# Patient Record
Sex: MALE | Race: WHITE
[De-identification: names, ages, dates, MRNs, and addresses within clinical notes are randomized per-mention and may not be internally consistent; named-entity substitution may affect disease eponyms.]

---

## 2018-10-13 ENCOUNTER — HOSPITAL ENCOUNTER (EMERGENCY)
Dept: HOSPITAL 94 - ER | Age: 48
Discharge: HOME | End: 2018-10-13
Payer: MEDICAID

## 2018-10-13 VITALS — WEIGHT: 145.31 LBS | HEIGHT: 66 IN | BODY MASS INDEX: 23.35 KG/M2

## 2018-10-13 VITALS — SYSTOLIC BLOOD PRESSURE: 166 MMHG | DIASTOLIC BLOOD PRESSURE: 88 MMHG

## 2018-10-13 DIAGNOSIS — Z96.22: ICD-10-CM
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
## 2018-10-13 DIAGNOSIS — S61.217A: Primary | ICD-10-CM

## 2018-10-13 DIAGNOSIS — Z88.0: ICD-10-CM
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
## 2018-10-13 DIAGNOSIS — F12.90: ICD-10-CM
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
## 2018-10-13 DIAGNOSIS — F15.90: ICD-10-CM

## 2018-10-13 DIAGNOSIS — Z87.891: ICD-10-CM
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
## 2018-10-13 PROCEDURE — 99284 EMERGENCY DEPT VISIT MOD MDM: CPT

## 2018-10-13 PROCEDURE — 90715 TDAP VACCINE 7 YRS/> IM: CPT

## 2018-10-13 PROCEDURE — 73140 X-RAY EXAM OF FINGER(S): CPT

## 2018-10-13 PROCEDURE — 90471 IMMUNIZATION ADMIN: CPT

## 2018-10-13 PROCEDURE — 96365 THER/PROPH/DIAG IV INF INIT: CPT

## 2019-01-13 ENCOUNTER — HOSPITAL ENCOUNTER (EMERGENCY)
Dept: HOSPITAL 94 - ER | Age: 49
Discharge: HOME | End: 2019-01-13
Payer: MEDICAID

## 2019-01-13 VITALS — WEIGHT: 140.21 LBS | HEIGHT: 66 IN | BODY MASS INDEX: 22.53 KG/M2

## 2019-01-13 VITALS — SYSTOLIC BLOOD PRESSURE: 128 MMHG | DIASTOLIC BLOOD PRESSURE: 82 MMHG

## 2019-01-13 DIAGNOSIS — F12.90: ICD-10-CM

## 2019-01-13 DIAGNOSIS — A60.01: Primary | ICD-10-CM

## 2019-01-13 DIAGNOSIS — F15.90: ICD-10-CM

## 2019-01-13 PROCEDURE — 99283 EMERGENCY DEPT VISIT LOW MDM: CPT

## 2019-01-13 PROCEDURE — 86695 HERPES SIMPLEX TYPE 1 TEST: CPT

## 2019-01-13 PROCEDURE — 86696 HERPES SIMPLEX TYPE 2 TEST: CPT

## 2019-01-13 PROCEDURE — 36415 COLL VENOUS BLD VENIPUNCTURE: CPT

## 2019-01-13 NOTE — NUR
Called patient and davidson answered. Davidson is listed on patients contact 
information, but stated that I had the wrong [email or web address]

## 2019-01-28 ENCOUNTER — HOSPITAL ENCOUNTER (EMERGENCY)
Dept: HOSPITAL 94 - ER | Age: 49
Discharge: HOME | End: 2019-01-28
Payer: MEDICAID

## 2019-01-28 VITALS — BODY MASS INDEX: 24.59 KG/M2 | HEIGHT: 66 IN | WEIGHT: 153 LBS

## 2019-01-28 VITALS — DIASTOLIC BLOOD PRESSURE: 67 MMHG | SYSTOLIC BLOOD PRESSURE: 116 MMHG

## 2019-01-28 DIAGNOSIS — F12.90: ICD-10-CM

## 2019-01-28 DIAGNOSIS — Z59.0: ICD-10-CM

## 2019-01-28 DIAGNOSIS — F15.90: ICD-10-CM

## 2019-01-28 DIAGNOSIS — Z88.0: ICD-10-CM

## 2019-01-28 DIAGNOSIS — N48.1: Primary | ICD-10-CM

## 2019-01-28 LAB
BACTERIA URNS QL MICRO: (no result) /HPF
CAOX CRY URNS QL MICRO: (no result) /HPF
CLARITY UR: (no result)
COLOR UR: YELLOW
DEPRECATED SQUAMOUS URNS QL MICRO: (no result) /LPF
GLUCOSE UR STRIP-MCNC: NEGATIVE MG/DL
HGB UR QL STRIP: (no result)
KETONES UR STRIP-MCNC: (no result) MG/DL
LEUKOCYTE ESTERASE UR QL STRIP: (no result)
MIXED CELL CASTS #/AREA URNS HPF: (no result) /LPF
MUCOUS THREADS URNS QL MICRO: (no result) /LPF
NITRITE UR QL STRIP: NEGATIVE
PH UR STRIP: 5.5 [PH] (ref 4.8–8)
PROT UR QL STRIP: NEGATIVE MG/DL
RBC #/AREA URNS HPF: (no result) /HPF (ref 0–2)
SP GR UR STRIP: >=1.03 (ref 1–1.03)
URN COLLECT METHOD CLASS: (no result)
UROBILINOGEN UR STRIP-MCNC: 0.2 E.U/DL (ref 0.2–1)

## 2019-01-28 PROCEDURE — 87088 URINE BACTERIA CULTURE: CPT

## 2019-01-28 PROCEDURE — 36415 COLL VENOUS BLD VENIPUNCTURE: CPT

## 2019-01-28 PROCEDURE — 99283 EMERGENCY DEPT VISIT LOW MDM: CPT

## 2019-01-28 PROCEDURE — 86592 SYPHILIS TEST NON-TREP QUAL: CPT

## 2019-01-28 PROCEDURE — 87591 N.GONORRHOEAE DNA AMP PROB: CPT

## 2019-01-28 PROCEDURE — 87491 CHLMYD TRACH DNA AMP PROBE: CPT

## 2019-01-28 PROCEDURE — 87077 CULTURE AEROBIC IDENTIFY: CPT

## 2019-01-28 PROCEDURE — 81001 URINALYSIS AUTO W/SCOPE: CPT

## 2019-01-28 PROCEDURE — 87186 SC STD MICRODIL/AGAR DIL: CPT

## 2019-01-28 SDOH — ECONOMIC STABILITY - HOUSING INSECURITY: HOMELESSNESS: Z59.0

## 2020-03-24 ENCOUNTER — HOSPITAL ENCOUNTER (EMERGENCY)
Dept: HOSPITAL 94 - ER | Age: 50
Discharge: HOME | End: 2020-03-24
Payer: MEDICAID

## 2020-03-24 VITALS — WEIGHT: 145.51 LBS | HEIGHT: 66 IN | BODY MASS INDEX: 23.38 KG/M2

## 2020-03-24 VITALS — SYSTOLIC BLOOD PRESSURE: 133 MMHG | DIASTOLIC BLOOD PRESSURE: 82 MMHG

## 2020-03-24 DIAGNOSIS — Z86.19: ICD-10-CM

## 2020-03-24 DIAGNOSIS — B00.9: Primary | ICD-10-CM

## 2020-03-24 DIAGNOSIS — Z59.0: ICD-10-CM

## 2020-03-24 DIAGNOSIS — F12.90: ICD-10-CM

## 2020-03-24 DIAGNOSIS — Z79.899: ICD-10-CM

## 2020-03-24 DIAGNOSIS — Z98.890: ICD-10-CM

## 2020-03-24 DIAGNOSIS — Z88.0: ICD-10-CM

## 2020-03-24 DIAGNOSIS — N48.5: ICD-10-CM

## 2020-03-24 DIAGNOSIS — F15.90: ICD-10-CM

## 2020-03-24 PROCEDURE — 86592 SYPHILIS TEST NON-TREP QUAL: CPT

## 2020-03-24 PROCEDURE — 96372 THER/PROPH/DIAG INJ SC/IM: CPT

## 2020-03-24 PROCEDURE — 36415 COLL VENOUS BLD VENIPUNCTURE: CPT

## 2020-03-24 PROCEDURE — 87591 N.GONORRHOEAE DNA AMP PROB: CPT

## 2020-03-24 PROCEDURE — 87491 CHLMYD TRACH DNA AMP PROBE: CPT

## 2020-03-24 PROCEDURE — 99284 EMERGENCY DEPT VISIT MOD MDM: CPT

## 2020-03-24 SDOH — ECONOMIC STABILITY - HOUSING INSECURITY: HOMELESSNESS: Z59.0

## 2025-02-25 ENCOUNTER — HOSPITAL ENCOUNTER (INPATIENT)
Dept: HOSPITAL 94 - ER | Age: 55
LOS: 2 days | Discharge: HOME | DRG: 720 | End: 2025-02-27
Attending: HOSPITALIST | Admitting: HOSPITALIST
Payer: COMMERCIAL

## 2025-02-25 VITALS
TEMPERATURE: 97.7 F | SYSTOLIC BLOOD PRESSURE: 137 MMHG | OXYGEN SATURATION: 90 % | DIASTOLIC BLOOD PRESSURE: 71 MMHG | HEART RATE: 101 BPM | RESPIRATION RATE: 36 BRPM

## 2025-02-25 VITALS — BODY MASS INDEX: 22.68 KG/M2 | HEIGHT: 66 IN | WEIGHT: 141.1 LBS

## 2025-02-25 DIAGNOSIS — D64.9: ICD-10-CM

## 2025-02-25 DIAGNOSIS — Z20.822: ICD-10-CM

## 2025-02-25 DIAGNOSIS — J18.9: ICD-10-CM

## 2025-02-25 DIAGNOSIS — Z88.0: ICD-10-CM

## 2025-02-25 DIAGNOSIS — R31.9: ICD-10-CM

## 2025-02-25 DIAGNOSIS — N17.0: ICD-10-CM

## 2025-02-25 DIAGNOSIS — N40.0: ICD-10-CM

## 2025-02-25 DIAGNOSIS — E87.1: ICD-10-CM

## 2025-02-25 DIAGNOSIS — Z88.1: ICD-10-CM

## 2025-02-25 DIAGNOSIS — Z79.899: ICD-10-CM

## 2025-02-25 DIAGNOSIS — A40.8: Primary | ICD-10-CM

## 2025-02-25 LAB
ALBUMIN SERPL BCP-MCNC: 2.4 G/DL (ref 3.4–5)
ALBUMIN/GLOB SERPL: 0.4 {RATIO} (ref 1.1–1.5)
ALP SERPL-CCNC: 88 IU/L (ref 46–116)
ALT SERPL W P-5'-P-CCNC: 63 U/L (ref 12–78)
AMPHETAMINES UR QL SCN: POSITIVE
ANION GAP SERPL CALCULATED.3IONS-SCNC: 7 MMOL/L (ref 8–16)
AST SERPL W P-5'-P-CCNC: 50 U/L (ref 10–37)
BACTERIA URNS QL MICRO: (no result) /HPF
BARBITURATES UR QL SCN: NEGATIVE
BASOPHILS # BLD AUTO: 0 X10'3 (ref 0–0.2)
BASOPHILS NFR BLD AUTO: 0.2 % (ref 0–1)
BENZODIAZ UR QL SCN: NEGATIVE
BILIRUB SERPL-MCNC: 0.8 MG/DL (ref 0.1–1)
BILIRUB UR QL STRIP: (no result)
BUN SERPL-MCNC: 19 MG/DL (ref 7–18)
BUN/CREAT SERPL: 16.2 (ref 10–20)
BZE UR QL SCN: NEGATIVE
CALCIUM SERPL-MCNC: 8.6 MG/DL (ref 8.5–10.1)
CANNABINOIDS UR QL SCN: POSITIVE
CHLORIDE SERPL-SCNC: 91 MMOL/L (ref 99–107)
CLARITY UR: CLEAR
CO2 SERPL-SCNC: 29.4 MMOL/L (ref 24–32)
COARSE GRAN CASTS URNS QL MICRO: (no result) /LPF
COLOR UR: (no result)
CREAT CL PREDICTED SERPL C-G-VRATE: 64 ML/MIN
CREAT SERPL-MCNC: 1.17 MG/DL (ref 0.6–1.1)
DEPRECATED SQUAMOUS URNS QL MICRO: (no result) /LPF
EOSINOPHIL # BLD AUTO: 0 X10'3 (ref 0–0.9)
EOSINOPHIL NFR BLD AUTO: 0 % (ref 0–6)
ERYTHROCYTE [DISTWIDTH] IN BLOOD BY AUTOMATED COUNT: 14.2 % (ref 11.5–14.5)
GFR SERPL CREATININE-BSD FRML MDRD: 64 ML/MIN
GLOBULIN SER CALC-MCNC: 5.5 G/DL (ref 2.7–4.3)
GLUCOSE SERPL-MCNC: 110 MG/DL (ref 70–104)
GLUCOSE UR STRIP-MCNC: NEGATIVE MG/DL
HCT VFR BLD AUTO: 35.7 % (ref 42–52)
HGB BLD-MCNC: 12 G/DL (ref 14–17.9)
HGB UR QL STRIP: (no result)
KETONES UR STRIP-MCNC: NEGATIVE MG/DL
LEUKOCYTE ESTERASE UR QL STRIP: NEGATIVE
LYMPHOCYTES # BLD AUTO: 0.6 X10'3 (ref 1.1–4.8)
LYMPHOCYTES NFR BLD AUTO: 3.6 % (ref 21–51)
MAGNESIUM SERPL-MCNC: 2 MG/DL (ref 1.5–2.4)
MCH RBC QN AUTO: 30.6 PG (ref 27–31)
MCHC RBC AUTO-ENTMCNC: 33.6 G/DL (ref 33–36.5)
MCV RBC AUTO: 91.2 FL (ref 78–98)
METHADONE UR QL SCN: NEGATIVE
MONOCYTES # BLD AUTO: 0.9 X10'3 (ref 0–0.9)
MONOCYTES NFR BLD AUTO: 5.5 % (ref 2–12)
NEUTROPHILS # BLD AUTO: 14.8 X10'3 (ref 1.8–7.7)
NEUTROPHILS NFR BLD AUTO: 90.7 % (ref 42–75)
NITRITE UR QL STRIP: NEGATIVE
NT-PROBNP SERPL-MCNC: 286 PG/ML (ref 0–125)
OPIATES UR QL SCN: NEGATIVE
PCP UR QL SCN: NEGATIVE
PH UR STRIP: 6 [PH] (ref 4.8–8)
PHOSPHATE SERPL-MCNC: 2.6 MG/DL (ref 2.3–4.5)
PLATELET # BLD AUTO: 404 X10'3 (ref 140–440)
PMV BLD AUTO: 6.8 FL (ref 7.4–10.4)
POTASSIUM SERPL-SCNC: 4 MMOL/L (ref 3.5–5.1)
PROT SERPL-MCNC: 7.9 G/DL (ref 6.4–8.2)
PROT UR QL STRIP: 100 MG/DL
RBC # BLD AUTO: 3.91 X10'6 (ref 4.7–6.1)
RBC #/AREA URNS HPF: (no result) /HPF (ref 0–2)
SODIUM SERPL-SCNC: 127 MMOL/L (ref 135–145)
SP GR UR STRIP: 1.02 (ref 1–1.03)
TSH SERPL DL<=0.05 MIU/L-ACNC: 1.66 ULU/ML (ref 0.34–4.5)
URN COLLECT METHOD CLASS: (no result)
UROBILINOGEN UR STRIP-MCNC: 4 E.U/DL (ref 0.2–1)
WBC # BLD AUTO: 16.3 X10'3 (ref 4.5–11)
WBC #/AREA URNS HPF: (no result) /HPF (ref 0–4)

## 2025-02-25 PROCEDURE — 80305 DRUG TEST PRSMV DIR OPT OBS: CPT

## 2025-02-25 PROCEDURE — 71250 CT THORAX DX C-: CPT

## 2025-02-25 PROCEDURE — 74176 CT ABD & PELVIS W/O CONTRAST: CPT

## 2025-02-25 PROCEDURE — 80053 COMPREHEN METABOLIC PANEL: CPT

## 2025-02-25 PROCEDURE — 84484 ASSAY OF TROPONIN QUANT: CPT

## 2025-02-25 PROCEDURE — 87811 SARS-COV-2 COVID19 W/OPTIC: CPT

## 2025-02-25 PROCEDURE — 93005 ELECTROCARDIOGRAM TRACING: CPT

## 2025-02-25 PROCEDURE — 84145 PROCALCITONIN (PCT): CPT

## 2025-02-25 PROCEDURE — 83605 ASSAY OF LACTIC ACID: CPT

## 2025-02-25 PROCEDURE — 87186 SC STD MICRODIL/AGAR DIL: CPT

## 2025-02-25 PROCEDURE — 87040 BLOOD CULTURE FOR BACTERIA: CPT

## 2025-02-25 PROCEDURE — 71045 X-RAY EXAM CHEST 1 VIEW: CPT

## 2025-02-25 PROCEDURE — 82948 REAGENT STRIP/BLOOD GLUCOSE: CPT

## 2025-02-25 PROCEDURE — 85025 COMPLETE CBC W/AUTO DIFF WBC: CPT

## 2025-02-25 PROCEDURE — 87503 INFLUENZA DNA AMP PROB ADDL: CPT

## 2025-02-25 PROCEDURE — 36415 COLL VENOUS BLD VENIPUNCTURE: CPT

## 2025-02-25 PROCEDURE — 84443 ASSAY THYROID STIM HORMONE: CPT

## 2025-02-25 PROCEDURE — 87502 INFLUENZA DNA AMP PROBE: CPT

## 2025-02-25 PROCEDURE — 83735 ASSAY OF MAGNESIUM: CPT

## 2025-02-25 PROCEDURE — 83880 ASSAY OF NATRIURETIC PEPTIDE: CPT

## 2025-02-25 PROCEDURE — 87070 CULTURE OTHR SPECIMN AEROBIC: CPT

## 2025-02-25 PROCEDURE — 81001 URINALYSIS AUTO W/SCOPE: CPT

## 2025-02-25 PROCEDURE — 87081 CULTURE SCREEN ONLY: CPT

## 2025-02-25 PROCEDURE — 99291 CRITICAL CARE FIRST HOUR: CPT

## 2025-02-25 PROCEDURE — 84100 ASSAY OF PHOSPHORUS: CPT

## 2025-02-25 PROCEDURE — 87077 CULTURE AEROBIC IDENTIFY: CPT

## 2025-02-25 RX ADMIN — SODIUM CHLORIDE, SODIUM LACTATE, POTASSIUM CHLORIDE, AND CALCIUM CHLORIDE ONE MLS/HR: .6; .31; .03; .02 INJECTION, SOLUTION INTRAVENOUS at 17:45

## 2025-02-25 RX ADMIN — SODIUM CHLORIDE ONE MLS/HR: 9 INJECTION INTRAMUSCULAR; INTRAVENOUS; SUBCUTANEOUS at 13:03

## 2025-02-25 RX ADMIN — LEVOFLOXACIN STA MLS/HR: 750 INJECTION, SOLUTION INTRAVENOUS at 15:02

## 2025-02-25 RX ADMIN — SODIUM CHLORIDE SCH MLS/HR: 9 INJECTION INTRAMUSCULAR; INTRAVENOUS; SUBCUTANEOUS at 15:37

## 2025-02-26 VITALS
OXYGEN SATURATION: 97 % | HEART RATE: 87 BPM | RESPIRATION RATE: 16 BRPM | TEMPERATURE: 97.9 F | SYSTOLIC BLOOD PRESSURE: 128 MMHG | DIASTOLIC BLOOD PRESSURE: 72 MMHG

## 2025-02-26 VITALS
HEART RATE: 84 BPM | SYSTOLIC BLOOD PRESSURE: 127 MMHG | DIASTOLIC BLOOD PRESSURE: 86 MMHG | OXYGEN SATURATION: 98 % | TEMPERATURE: 97.6 F | RESPIRATION RATE: 16 BRPM

## 2025-02-26 VITALS
SYSTOLIC BLOOD PRESSURE: 115 MMHG | DIASTOLIC BLOOD PRESSURE: 77 MMHG | TEMPERATURE: 97.6 F | HEART RATE: 93 BPM | RESPIRATION RATE: 16 BRPM | OXYGEN SATURATION: 97 %

## 2025-02-26 VITALS
HEART RATE: 106 BPM | DIASTOLIC BLOOD PRESSURE: 72 MMHG | RESPIRATION RATE: 24 BRPM | TEMPERATURE: 97.1 F | OXYGEN SATURATION: 97 % | SYSTOLIC BLOOD PRESSURE: 125 MMHG

## 2025-02-26 VITALS
DIASTOLIC BLOOD PRESSURE: 66 MMHG | SYSTOLIC BLOOD PRESSURE: 123 MMHG | HEART RATE: 103 BPM | RESPIRATION RATE: 16 BRPM | TEMPERATURE: 98.7 F | OXYGEN SATURATION: 96 %

## 2025-02-26 VITALS
HEART RATE: 104 BPM | DIASTOLIC BLOOD PRESSURE: 77 MMHG | OXYGEN SATURATION: 97 % | TEMPERATURE: 97.9 F | SYSTOLIC BLOOD PRESSURE: 134 MMHG | RESPIRATION RATE: 20 BRPM

## 2025-02-26 VITALS — RESPIRATION RATE: 16 BRPM | OXYGEN SATURATION: 97 %

## 2025-02-26 VITALS — RESPIRATION RATE: 24 BRPM | OXYGEN SATURATION: 95 %

## 2025-02-26 LAB
ALBUMIN SERPL BCP-MCNC: 1.5 G/DL (ref 3.4–5)
ALBUMIN/GLOB SERPL: 0.3 {RATIO} (ref 1.1–1.5)
ALP SERPL-CCNC: 70 IU/L (ref 46–116)
ALT SERPL W P-5'-P-CCNC: 44 U/L (ref 12–78)
ANION GAP SERPL CALCULATED.3IONS-SCNC: 5 MMOL/L (ref 8–16)
AST SERPL W P-5'-P-CCNC: 48 U/L (ref 10–37)
BASOPHILS # BLD AUTO: 0 X10'3 (ref 0–0.2)
BASOPHILS NFR BLD AUTO: 0 % (ref 0–1)
BILIRUB SERPL-MCNC: 0.4 MG/DL (ref 0.1–1)
BUN SERPL-MCNC: 14 MG/DL (ref 7–18)
BUN/CREAT SERPL: 14.7 (ref 10–20)
CALCIUM SERPL-MCNC: 7.8 MG/DL (ref 8.5–10.1)
CHLORIDE SERPL-SCNC: 99 MMOL/L (ref 99–107)
CO2 SERPL-SCNC: 27.6 MMOL/L (ref 24–32)
CREAT CL PREDICTED SERPL C-G-VRATE: 78 ML/MIN
CREAT SERPL-MCNC: 0.95 MG/DL (ref 0.6–1.1)
EOSINOPHIL # BLD AUTO: 0 X10'3 (ref 0–0.9)
EOSINOPHIL NFR BLD AUTO: 0.1 % (ref 0–6)
ERYTHROCYTE [DISTWIDTH] IN BLOOD BY AUTOMATED COUNT: 14.3 % (ref 11.5–14.5)
GFR SERPL CREATININE-BSD FRML MDRD: 82 ML/MIN
GLOBULIN SER CALC-MCNC: 4.5 G/DL (ref 2.7–4.3)
GLUCOSE SERPL-MCNC: 101 MG/DL (ref 70–104)
HCT VFR BLD AUTO: 32.5 % (ref 42–52)
HGB BLD-MCNC: 10.7 G/DL (ref 14–17.9)
LYMPHOCYTES # BLD AUTO: 0.7 X10'3 (ref 1.1–4.8)
LYMPHOCYTES NFR BLD AUTO: 5.5 % (ref 21–51)
MCH RBC QN AUTO: 30.3 PG (ref 27–31)
MCHC RBC AUTO-ENTMCNC: 32.9 G/DL (ref 33–36.5)
MCV RBC AUTO: 92.1 FL (ref 78–98)
MONOCYTES # BLD AUTO: 0.5 X10'3 (ref 0–0.9)
MONOCYTES NFR BLD AUTO: 4.5 % (ref 2–12)
NEUTROPHILS # BLD AUTO: 10.8 X10'3 (ref 1.8–7.7)
NEUTROPHILS NFR BLD AUTO: 89.9 % (ref 42–75)
PLATELET # BLD AUTO: 314 X10'3 (ref 140–440)
PMV BLD AUTO: 7.1 FL (ref 7.4–10.4)
POTASSIUM SERPL-SCNC: 3.5 MMOL/L (ref 3.5–5.1)
PROT SERPL-MCNC: 6 G/DL (ref 6.4–8.2)
RBC # BLD AUTO: 3.53 X10'6 (ref 4.7–6.1)
SODIUM SERPL-SCNC: 132 MMOL/L (ref 135–145)
WBC # BLD AUTO: 12 X10'3 (ref 4.5–11)

## 2025-02-26 RX ADMIN — LEVOFLOXACIN SCH MLS/HR: 750 INJECTION, SOLUTION INTRAVENOUS at 09:25

## 2025-02-26 RX ADMIN — ENOXAPARIN SODIUM SCH MG: 100 INJECTION SUBCUTANEOUS at 09:26

## 2025-02-27 VITALS
TEMPERATURE: 97.7 F | OXYGEN SATURATION: 97 % | SYSTOLIC BLOOD PRESSURE: 131 MMHG | HEART RATE: 82 BPM | DIASTOLIC BLOOD PRESSURE: 77 MMHG | RESPIRATION RATE: 13 BRPM

## 2025-02-27 VITALS
TEMPERATURE: 97.8 F | HEART RATE: 70 BPM | SYSTOLIC BLOOD PRESSURE: 126 MMHG | DIASTOLIC BLOOD PRESSURE: 77 MMHG | OXYGEN SATURATION: 94 % | RESPIRATION RATE: 14 BRPM

## 2025-02-27 VITALS — RESPIRATION RATE: 13 BRPM | OXYGEN SATURATION: 97 %

## 2025-02-27 LAB
ALBUMIN SERPL BCP-MCNC: 1.5 G/DL (ref 3.4–5)
ALBUMIN/GLOB SERPL: 0.3 {RATIO} (ref 1.1–1.5)
ALP SERPL-CCNC: 78 IU/L (ref 46–116)
ALT SERPL W P-5'-P-CCNC: 44 U/L (ref 12–78)
ANION GAP SERPL CALCULATED.3IONS-SCNC: 7 MMOL/L (ref 8–16)
AST SERPL W P-5'-P-CCNC: 44 U/L (ref 10–37)
BASOPHILS # BLD AUTO: 0 X10'3 (ref 0–0.2)
BASOPHILS NFR BLD AUTO: 0 % (ref 0–1)
BILIRUB SERPL-MCNC: 0.3 MG/DL (ref 0.1–1)
BUN SERPL-MCNC: 13 MG/DL (ref 7–18)
BUN/CREAT SERPL: 15.7 (ref 10–20)
CALCIUM SERPL-MCNC: 8.1 MG/DL (ref 8.5–10.1)
CHLORIDE SERPL-SCNC: 100 MMOL/L (ref 99–107)
CO2 SERPL-SCNC: 27.4 MMOL/L (ref 24–32)
CREAT CL PREDICTED SERPL C-G-VRATE: 90 ML/MIN
CREAT SERPL-MCNC: 0.83 MG/DL (ref 0.6–1.1)
EOSINOPHIL # BLD AUTO: 0 X10'3 (ref 0–0.9)
EOSINOPHIL NFR BLD AUTO: 0.2 % (ref 0–6)
ERYTHROCYTE [DISTWIDTH] IN BLOOD BY AUTOMATED COUNT: 14.3 % (ref 11.5–14.5)
GFR SERPL CREATININE-BSD FRML MDRD: > 90 ML/MIN
GLOBULIN SER CALC-MCNC: 4.7 G/DL (ref 2.7–4.3)
GLUCOSE SERPL-MCNC: 107 MG/DL (ref 70–104)
HCT VFR BLD AUTO: 33.1 % (ref 42–52)
HGB BLD-MCNC: 11 G/DL (ref 14–17.9)
LYMPHOCYTES # BLD AUTO: 0.8 X10'3 (ref 1.1–4.8)
LYMPHOCYTES NFR BLD AUTO: 6.7 % (ref 21–51)
MCH RBC QN AUTO: 30.5 PG (ref 27–31)
MCHC RBC AUTO-ENTMCNC: 33.3 G/DL (ref 33–36.5)
MCV RBC AUTO: 91.4 FL (ref 78–98)
MONOCYTES # BLD AUTO: 0.6 X10'3 (ref 0–0.9)
MONOCYTES NFR BLD AUTO: 5.2 % (ref 2–12)
NEUTROPHILS # BLD AUTO: 10.6 X10'3 (ref 1.8–7.7)
NEUTROPHILS NFR BLD AUTO: 87.9 % (ref 42–75)
PLATELET # BLD AUTO: 365 X10'3 (ref 140–440)
PMV BLD AUTO: 7.1 FL (ref 7.4–10.4)
POTASSIUM SERPL-SCNC: 3.5 MMOL/L (ref 3.5–5.1)
PROT SERPL-MCNC: 6.2 G/DL (ref 6.4–8.2)
RBC # BLD AUTO: 3.62 X10'6 (ref 4.7–6.1)
SODIUM SERPL-SCNC: 134 MMOL/L (ref 135–145)
WBC # BLD AUTO: 12.1 X10'3 (ref 4.5–11)